# Patient Record
Sex: MALE | Race: WHITE | NOT HISPANIC OR LATINO | Employment: FULL TIME | URBAN - METROPOLITAN AREA
[De-identification: names, ages, dates, MRNs, and addresses within clinical notes are randomized per-mention and may not be internally consistent; named-entity substitution may affect disease eponyms.]

---

## 2023-04-19 ENCOUNTER — HOSPITAL ENCOUNTER (OUTPATIENT)
Dept: DATA CONVERSION | Facility: HOSPITAL | Age: 63
End: 2023-04-19
Attending: SURGERY | Admitting: SURGERY
Payer: COMMERCIAL

## 2023-04-19 DIAGNOSIS — Z53.8 PROCEDURE AND TREATMENT NOT CARRIED OUT FOR OTHER REASONS: ICD-10-CM

## 2023-04-19 DIAGNOSIS — K64.8 OTHER HEMORRHOIDS: ICD-10-CM

## 2023-08-23 ENCOUNTER — HOSPITAL ENCOUNTER (OUTPATIENT)
Dept: DATA CONVERSION | Facility: HOSPITAL | Age: 63
End: 2023-08-23
Attending: SURGERY | Admitting: SURGERY
Payer: COMMERCIAL

## 2023-08-23 DIAGNOSIS — K64.4 RESIDUAL HEMORRHOIDAL SKIN TAGS: ICD-10-CM

## 2023-08-23 DIAGNOSIS — K64.8 OTHER HEMORRHOIDS: ICD-10-CM

## 2023-08-23 DIAGNOSIS — K60.2 ANAL FISSURE, UNSPECIFIED: ICD-10-CM

## 2023-09-01 LAB
COMPLETE PATHOLOGY REPORT: NORMAL
CONVERTED CLINICAL DIAGNOSIS-HISTORY: NORMAL
CONVERTED FINAL DIAGNOSIS: NORMAL
CONVERTED FINAL REPORT PDF LINK TO COPY AND PASTE: NORMAL
CONVERTED GROSS DESCRIPTION: NORMAL

## 2023-09-07 VITALS — HEIGHT: 67 IN | WEIGHT: 209.44 LBS | BODY MASS INDEX: 32.87 KG/M2

## 2023-09-14 NOTE — H&P
History of Present Illness:   History Present Illness:  Reason for surgery: Hemorrhoids   HPI:    Patient has symptomatic hemorrhoids. Requires excision.    Allergies:        Allergies:  ·  No Known Allergies :     Home Medication Review:   Home Medications Reviewed: yes     Impression/Procedure:   ·  Impression and Planned Procedure: Hemorrhoids and hemorrhoidectomy       ERAS (Enhanced Recovery After Surgery):  ·  ERAS Patient: no       Physical Exam by System:    Respiratory/Thorax: CTAB   Cardiovascular: RRR     Consent:   COVID-19 Consent:  ·  COVID-19 Risk Consent Surgeon has reviewed key risks related to the risk of rodrigo COVID-19 and if they contract COVID-19 what the risks are.     Attestation:   Note Completion:  I am a:  Resident/Fellow   Attending Attestation I saw and evaluated the patient.  I personally obtained the key and critical portions of the history and physical exam or was physically present for key and  critical portions performed by the resident/fellow. I reviewed the resident/fellow?s documentation and discussed the patient with the resident/fellow.  I agree with the resident/fellow?s medical decision making as documented in their note  with the exception/addition of the following:    I personally evaluated the patient on 19-Apr-2023   Comments/ Additional Findings    Pt blood pressure very elevated, not improving.  D/W Dr Johnson.  Recc pt to be seen by cardiology and blood pressure be under better control prior  to proceeding with elective hemorrhoid surgery.  D/W pt and wife agreeable to plan.  Will need to reschedule surgery once BP controlled.           Electronic Signatures:  Fausto Lazar (Resident))  (Signed 19-Apr-2023 08:00)   Authored: History of Present Illness, Allergies, Home  Medication Review, Impression/Procedure, ERAS, Physical Exam, Consent, Note Completion  Sherrie Barriga)  (Signed 19-Apr-2023 09:14)   Authored: Note Completion   Co-Signer: History of  Present Illness, Allergies, Home Medication Review, Impression/Procedure, ERAS, Physical Exam, Consent, Note Completion      Last Updated: 19-Apr-2023 09:14 by Sherrie Barriga)

## 2023-09-29 VITALS
DIASTOLIC BLOOD PRESSURE: 109 MMHG | BODY MASS INDEX: 30.74 KG/M2 | HEART RATE: 71 BPM | WEIGHT: 202.82 LBS | RESPIRATION RATE: 16 BRPM | SYSTOLIC BLOOD PRESSURE: 139 MMHG | HEIGHT: 68 IN | TEMPERATURE: 97.9 F

## 2023-09-30 NOTE — H&P
History of Present Illness:   History Present Illness:  Reason for surgery: hemorrhoids   HPI:    pt here for hemorrhoidectomy X 3     Allergies:        Allergies:  ·  No Known Allergies :     Home Medication Review:   Home Medications Reviewed: yes     Impression/Procedure:   ·  Impression and Planned Procedure: Pt here for hemorrhoidectomy X3       ERAS (Enhanced Recovery After Surgery):  ·  ERAS Patient: no       Vital Signs:  Temperature C: 36.6 degrees C   Temperature F: 97.8 degrees F   Heart Rate: 71 beats per minute   Respiratory Rate: 16 breath per minute   Blood Pressure Systolic: 139 mm/Hg   Blood Pressure Diastolic: 109 mm/Hg     Physical Exam by System:    Constitutional: NAD   Respiratory/Thorax: non labored   Cardiovascular: RRR     Consent:   COVID-19 Consent:  ·  COVID-19 Risk Consent Surgeon has reviewed key risks related to the risk of rodrigo COVID-19 and if they contract COVID-19 what the risks are.       Electronic Signatures:  Sherrie Barriga)  (Signed 23-Aug-2023 07:45)   Authored: History of Present Illness, Allergies, Home  Medication Review, Impression/Procedure, ERAS, Physical Exam, Consent, Note Completion      Last Updated: 23-Aug-2023 07:45 by Sherrie Barriga)

## 2023-10-01 NOTE — OP NOTE
PROCEDURE DETAILS    Preoperative Diagnosis:  internal hemorrhoids, K64.8    Postoperative Diagnosis:  posterior fissure with external hemorrhoid anteriorly  Surgeon: Sherrie Barriga  Resident/Fellow/Other Assistant: Giovanna Evans    Procedure:  1.  HEMORRHOIDECTOMY X 1, and internal lateral sphincterotomy    Anesthesia: No anesthesiologist associated with this case  Estimated Blood Loss: 2  Findings: Pt with posterior fissure with tight internal  sphincter and external hemorrhoids anteriorly.   Specimens(s) Collected: yes,  hemorrhoid         Operative Report:   Indication for procedure:   Pt had presented with hemorrhoids to office previously improved but still with issues here for exam under anesthesia and hemorrhoidectomy    Procedure:  Pt taken to or placed supine general anesthesia administered.  LMA placed.  Pt  into lithotomy position.  Rectal exam was done pt was  found to have a very tight internal sphincter and a posterior fissure.  Pt just with external hemorrhoids anteriorly.  The sphincter was injected laterally in a fanning fashion. 10 ml on both sides.  The left lateral skin overlying the sphincter was opened  and grasped with an Allis Clamp and partially burned.  The larger half moon was now able to be inserted.  The external hemorrhoid was grasped with a Wayne and removed with a harmonic scalpel.  Lidocaine jelly inserted and dressing applied.  Pt awakened  and LMA removed taken to PACU in stable condition.   Note Recipients:   Lizeth Curry MD - [phone] [preferred]  Sherrie Barriga MD - [phone] [Preferred]                        Attestation:   Note Completion:  Attending Attestation I performed the procedure without a resident         Electronic Signatures:  Sherrie Barriga)  (Signed 23-Aug-2023 08:32)   Authored: Post-Operative Note, Chart Review, Note Completion      Last Updated: 23-Aug-2023 08:32 by Sherrie Barriga)

## 2023-10-12 ENCOUNTER — OFFICE VISIT (OUTPATIENT)
Dept: ORTHOPEDIC SURGERY | Facility: HOSPITAL | Age: 63
End: 2023-10-12
Payer: MEDICARE

## 2023-10-12 DIAGNOSIS — M19.041 PRIMARY OSTEOARTHRITIS OF RIGHT HAND: Primary | ICD-10-CM

## 2023-10-12 DIAGNOSIS — M19.042 PRIMARY OSTEOARTHRITIS OF LEFT HAND: ICD-10-CM

## 2023-10-12 PROCEDURE — 1160F RVW MEDS BY RX/DR IN RCRD: CPT | Performed by: ORTHOPAEDIC SURGERY

## 2023-10-12 PROCEDURE — 2500000005 HC RX 250 GENERAL PHARMACY W/O HCPCS: Performed by: ORTHOPAEDIC SURGERY

## 2023-10-12 PROCEDURE — 99213 OFFICE O/P EST LOW 20 MIN: CPT | Performed by: ORTHOPAEDIC SURGERY

## 2023-10-12 PROCEDURE — 1159F MED LIST DOCD IN RCRD: CPT | Performed by: ORTHOPAEDIC SURGERY

## 2023-10-12 PROCEDURE — 20604 DRAIN/INJ JOINT/BURSA W/US: CPT | Performed by: ORTHOPAEDIC SURGERY

## 2023-10-12 PROCEDURE — 20604 DRAIN/INJ JOINT/BURSA W/US: CPT | Mod: 50 | Performed by: ORTHOPAEDIC SURGERY

## 2023-10-12 PROCEDURE — 1036F TOBACCO NON-USER: CPT | Performed by: ORTHOPAEDIC SURGERY

## 2023-10-12 PROCEDURE — 3008F BODY MASS INDEX DOCD: CPT | Performed by: ORTHOPAEDIC SURGERY

## 2023-10-12 PROCEDURE — 1125F AMNT PAIN NOTED PAIN PRSNT: CPT | Performed by: ORTHOPAEDIC SURGERY

## 2023-10-12 RX ORDER — LISINOPRIL 10 MG/1
10 TABLET ORAL DAILY
COMMUNITY

## 2023-10-12 RX ORDER — OMEPRAZOLE 10 MG/1
10 CAPSULE, DELAYED RELEASE ORAL
COMMUNITY

## 2023-10-12 RX ORDER — METHYLPREDNISOLONE ACETATE 40 MG/ML
40 INJECTION, SUSPENSION INTRA-ARTICULAR; INTRALESIONAL; INTRAMUSCULAR; SOFT TISSUE ONCE
Status: SHIPPED | OUTPATIENT
Start: 2023-10-12

## 2023-10-12 RX ORDER — LIDOCAINE HYDROCHLORIDE 10 MG/ML
0.5 INJECTION INFILTRATION; PERINEURAL
Status: COMPLETED | OUTPATIENT
Start: 2023-10-12 | End: 2023-10-12

## 2023-10-12 RX ADMIN — LIDOCAINE HYDROCHLORIDE 0.5 ML: 10 INJECTION, SOLUTION INFILTRATION; PERINEURAL at 13:32

## 2023-10-12 ASSESSMENT — PATIENT HEALTH QUESTIONNAIRE - PHQ9
2. FEELING DOWN, DEPRESSED OR HOPELESS: NOT AT ALL
SUM OF ALL RESPONSES TO PHQ9 QUESTIONS 1 AND 2: 0
1. LITTLE INTEREST OR PLEASURE IN DOING THINGS: NOT AT ALL

## 2023-10-12 ASSESSMENT — ENCOUNTER SYMPTOMS
WHEEZING: 0
ARTHRALGIAS: 1
TROUBLE SWALLOWING: 0
COLOR CHANGE: 0
JOINT SWELLING: 1
SHORTNESS OF BREATH: 0
SINUS PAIN: 0
FEVER: 0
CHILLS: 0
FATIGUE: 0

## 2023-10-12 ASSESSMENT — PAIN SCALES - GENERAL: PAINLEVEL_OUTOF10: 4

## 2023-10-12 ASSESSMENT — PAIN - FUNCTIONAL ASSESSMENT: PAIN_FUNCTIONAL_ASSESSMENT: 0-10

## 2023-10-12 NOTE — PROGRESS NOTES
Reason for Appointment  B/l hand pain and swelling     History of Present Illness  Patient is a 63 y.o. male here today for follow-up evaluation of recurrent bilateral hand pain and swelling. He has had multiple. Injections in the past that do give him good relief.  He has pain and swelling over index MP joints, he is very active and does a lot of work with tools.  He has pain with repetitive pinching and gripping.  He is not interested in any surgery at this point.  He would like repeat injections today.  No other changes in his past medical history, allergies, or medications.    History reviewed. No pertinent past medical history.    Past Surgical History:   Procedure Laterality Date    CARDIAC SURGERY  07/19/2018    Heart Surgery    FOOT SURGERY  07/19/2018    Foot Surgery    HAND SURGERY  07/19/2018    Hand Surgery                                                                                                                                                          HERNIA REPAIR  07/19/2018    Hernia Repair    KNEE SURGERY  07/19/2018    Knee Surgery Left    NECK SURGERY  07/19/2018    Neck Surgery    OTHER SURGICAL HISTORY  07/19/2018    Amputation Of Middle Finger, With Neurectomy (Each)    OTHER SURGICAL HISTORY  07/19/2018    Shoulder Surgery Left    SHOULDER SURGERY  07/19/2018    Shoulder Surgery Right       Medication Documentation Review Audit       Reviewed by Taylor Garcia MA (Medical Assistant) on 10/12/23 at 1147      Medication Order Taking? Sig Documenting Provider Last Dose Status   docusate sodium (Colace) 100 mg capsule [id number] No TAKE 1 CAPSULE BY MOUTH ONCE DAILY   Patient not taking: Reported on 10/12/2023    Sherrie Barriga MD Not Taking Active   lidocaine HCL 3 % cream [id number] No APPLY TOPICALLY TO AFFECTED AREA 3 TIMES A DAY   Patient not taking: Reported on 10/12/2023    Sherrie Barriga MD Not Taking Active   lisinopril 10 mg tablet [id number]  Take 1 tablet (10 mg) by mouth  once daily. Historical Provider, MD  Active   omeprazole (PriLOSEC) 10 mg DR capsule [id number]  Take 1 capsule (10 mg) by mouth once daily. Historical Provider, MD  Active   oxyCODONE-acetaminophen (Percocet) 5-325 mg tablet [id number] No TAKE 1 TABLET BY MOUTH EVERY 6 HOURS   Patient not taking: Reported on 10/12/2023    Sherrie Barriga MD Not Taking Active                    No Known Allergies    Review of Systems   Constitutional:  Negative for chills, fatigue and fever.   HENT:  Negative for sinus pain and trouble swallowing.    Respiratory:  Negative for shortness of breath and wheezing.    Cardiovascular:  Negative for chest pain and leg swelling.   Musculoskeletal:  Positive for arthralgias and joint swelling.   Skin:  Negative for color change and rash.     Exam   On exam bilateral hands show DJD, swelling over bilateral index MP joints right greater than left.  Significant tenderness over bilateral MP joints, no triggering of the digits.  Pain with flexion.  Good pulses and sensation in the upper extremity.    Assessment   Bilateral hand osteoarthritis    Plan   We sterilely injected under US guidance Depo-medrol and liodcaine into bilateral index MP joints. Patient understands the small risk of infection and the signs to look for as well as flare reaction and the risks of repeated injections. He can follow up with us as needed    Hand / UE Inj/Asp: L index MCP for osteoarthritis on 10/12/2023 1:32 PM  Indications: pain  Details: 25 G needle, ultrasound-guided  Medications: 0.5 mL lidocaine 10 mg/mL (1 %)  Outcome: tolerated well, no immediate complications    After discussing the risks and benefits of the procedure, we proceeded with the injections.  Under ultrasound guidance the bilateral index finger metacarpal head and the base of the proximal phalanx was identified.  We then placed the needle under ultrasound guidance into the joint space and sterilely injected 20 mg of Depo-Medrol and a small amount  of lidocaine.  Patient tolerated the procedures well without any adverse side effects.   Procedure, treatment alternatives, risks and benefits explained, specific risks discussed. Consent was given by the patient. Immediately prior to procedure a time out was called to verify the correct patient, procedure, equipment, support staff and site/side marked as required. Patient was prepped and draped in the usual sterile fashion.             Written by Di Patel saw, evaluated, and treated the patient with the PA

## 2024-02-29 ENCOUNTER — OFFICE VISIT (OUTPATIENT)
Dept: ORTHOPEDIC SURGERY | Facility: HOSPITAL | Age: 64
End: 2024-02-29
Payer: COMMERCIAL

## 2024-02-29 DIAGNOSIS — M19.041 PRIMARY OSTEOARTHRITIS OF BOTH HANDS: Primary | ICD-10-CM

## 2024-02-29 DIAGNOSIS — M19.042 PRIMARY OSTEOARTHRITIS OF BOTH HANDS: Primary | ICD-10-CM

## 2024-02-29 PROCEDURE — 1160F RVW MEDS BY RX/DR IN RCRD: CPT | Performed by: ORTHOPAEDIC SURGERY

## 2024-02-29 PROCEDURE — 20604 DRAIN/INJ JOINT/BURSA W/US: CPT | Mod: 50 | Performed by: ORTHOPAEDIC SURGERY

## 2024-02-29 PROCEDURE — 20604 DRAIN/INJ JOINT/BURSA W/US: CPT | Performed by: ORTHOPAEDIC SURGERY

## 2024-02-29 PROCEDURE — 2500000004 HC RX 250 GENERAL PHARMACY W/ HCPCS (ALT 636 FOR OP/ED): Performed by: ORTHOPAEDIC SURGERY

## 2024-02-29 PROCEDURE — 2500000005 HC RX 250 GENERAL PHARMACY W/O HCPCS: Performed by: ORTHOPAEDIC SURGERY

## 2024-02-29 PROCEDURE — 1159F MED LIST DOCD IN RCRD: CPT | Performed by: ORTHOPAEDIC SURGERY

## 2024-02-29 PROCEDURE — 1125F AMNT PAIN NOTED PAIN PRSNT: CPT | Performed by: ORTHOPAEDIC SURGERY

## 2024-02-29 PROCEDURE — 99213 OFFICE O/P EST LOW 20 MIN: CPT | Performed by: ORTHOPAEDIC SURGERY

## 2024-02-29 PROCEDURE — 3008F BODY MASS INDEX DOCD: CPT | Performed by: ORTHOPAEDIC SURGERY

## 2024-02-29 PROCEDURE — 1036F TOBACCO NON-USER: CPT | Performed by: ORTHOPAEDIC SURGERY

## 2024-02-29 RX ORDER — METHYLPREDNISOLONE ACETATE 40 MG/ML
20 INJECTION, SUSPENSION INTRA-ARTICULAR; INTRALESIONAL; INTRAMUSCULAR; SOFT TISSUE
Status: COMPLETED | OUTPATIENT
Start: 2024-02-29 | End: 2024-02-29

## 2024-02-29 RX ORDER — LIDOCAINE HYDROCHLORIDE 10 MG/ML
0.5 INJECTION INFILTRATION; PERINEURAL
Status: COMPLETED | OUTPATIENT
Start: 2024-02-29 | End: 2024-02-29

## 2024-02-29 RX ADMIN — LIDOCAINE HYDROCHLORIDE 0.5 ML: 10 INJECTION, SOLUTION INFILTRATION; PERINEURAL at 10:23

## 2024-02-29 RX ADMIN — METHYLPREDNISOLONE ACETATE 20 MG: 40 INJECTION, SUSPENSION INTRA-ARTICULAR; INTRALESIONAL; INTRAMUSCULAR; INTRASYNOVIAL; SOFT TISSUE at 10:24

## 2024-02-29 RX ADMIN — METHYLPREDNISOLONE ACETATE 20 MG: 40 INJECTION, SUSPENSION INTRA-ARTICULAR; INTRALESIONAL; INTRAMUSCULAR; INTRASYNOVIAL; SOFT TISSUE at 10:23

## 2024-02-29 RX ADMIN — LIDOCAINE HYDROCHLORIDE 0.5 ML: 10 INJECTION, SOLUTION INFILTRATION; PERINEURAL at 10:24

## 2024-02-29 ASSESSMENT — ENCOUNTER SYMPTOMS
FATIGUE: 0
TROUBLE SWALLOWING: 0
JOINT SWELLING: 1
FEVER: 0
CHILLS: 0
SHORTNESS OF BREATH: 0
WHEEZING: 0

## 2024-02-29 ASSESSMENT — PAIN SCALES - GENERAL: PAINLEVEL_OUTOF10: 7

## 2024-02-29 ASSESSMENT — PAIN - FUNCTIONAL ASSESSMENT: PAIN_FUNCTIONAL_ASSESSMENT: 0-10

## 2024-02-29 NOTE — PROGRESS NOTES
Reason for Appointment  B/l index finger pain and swelling     History of Present Illness  Patient is a 63 y.o. male here today for follow-up evaluation of recurrent bilateral index finger pain and swelling.  He has significant arthritis in the index finger MP joints, he has pain with repetitive gripping and in the cold.  He does wear gloves that help the pain.  He was an  for over 40 years and did a lot of repetitive activity with his hands.  Give him 4 to 6 months of relief.  No other changes in his past medical history, allergies, or medications.    History reviewed. No pertinent past medical history.    Past Surgical History:   Procedure Laterality Date    CARDIAC SURGERY  07/19/2018    Heart Surgery    FOOT SURGERY  07/19/2018    Foot Surgery    HAND SURGERY  07/19/2018    Hand Surgery                                                                                                                                                          HERNIA REPAIR  07/19/2018    Hernia Repair    KNEE SURGERY  07/19/2018    Knee Surgery Left    NECK SURGERY  07/19/2018    Neck Surgery    OTHER SURGICAL HISTORY  07/19/2018    Amputation Of Middle Finger, With Neurectomy (Each)    OTHER SURGICAL HISTORY  07/19/2018    Shoulder Surgery Left    SHOULDER SURGERY  07/19/2018    Shoulder Surgery Right       Medication Documentation Review Audit       Reviewed by Taylor Garcia MA (Medical Assistant) on 02/29/24 at 0920      Medication Order Taking? Sig Documenting Provider Last Dose Status   docusate sodium (Colace) 100 mg capsule [id number] Yes TAKE 1 CAPSULE BY MOUTH ONCE DAILY Sherrie Barriga MD Taking Active   lidocaine HCL 3 % cream [id number] Yes APPLY TOPICALLY TO AFFECTED AREA 3 TIMES A DAY Sherrie Barriga MD Taking Active   lisinopril 10 mg tablet [id number] Yes Take 1 tablet (10 mg) by mouth once daily. Historical Provider, MD Taking Active   methylPREDNISolone acetate (DEPO-Medrol) injection 40 mg [id number]    Di Perez PA-C  Active   methylPREDNISolone acetate (DEPO-Medrol) injection 40 mg [id number]   Di Perez PA-C  Active   omeprazole (PriLOSEC) 10 mg DR capsule [id number] Yes Take 1 capsule (10 mg) by mouth once daily. Historical Provider, MD Taking Active   oxyCODONE-acetaminophen (Percocet) 5-325 mg tablet [id number]  TAKE 1 TABLET BY MOUTH EVERY 6 HOURS   Patient not taking: Reported on 10/12/2023    Sherrie Barriga MD   [phone]                    No Known Allergies    Review of Systems   Constitutional:  Negative for chills, fatigue and fever.   HENT:  Negative for mouth sores and trouble swallowing.    Respiratory:  Negative for shortness of breath and wheezing.    Cardiovascular:  Negative for chest pain and leg swelling.   Musculoskeletal:  Positive for joint swelling.   Skin:  Negative for pallor and rash.     Exam   On exam bilateral hands show DJD but he has more swelling over bilateral MP joints.  Tenderness over bilateral index finger MP joints, pain with heavy gripping but no triggering of the digits.  Good pulses and sensation in the upper extremities.    Assessment   Bilateral hand osteoarthritis    Plan   We sterilely injected under ultrasound guidance Depo-Medrol and lidocaine into bilateral index finger MP joints.  Patient understands the small risk of infection and the signs to look for as well as flare reaction.  Hopefully these give him good relief.  He can follow-up with us as needed for occasional injections.    S Inj/Asp: R index MCP on 2024 10:23 AM  Indications: pain  Details: 25 G needle, ultrasound-guided  Medications: 0.5 mL lidocaine 10 mg/mL (1 %); 20 mg methylPREDNISolone acetate 40 mg/mL  Outcome: tolerated well, no immediate complications    After discussing the risks and benefits of the procedure, we proceeded with the injections.  Under ultrasound guidance the bilateral index finger metacarpal heads and the base of the proximal phalanx were  identified.  We then placed the needle under ultrasound guidance into the joint spaces and sterilely injected 20 mg of Depo-Medrol and a small amount of lidocaine.  Patient tolerated the procedures well without any adverse side effects.   Procedure, treatment alternatives, risks and benefits explained, specific risks discussed. Immediately prior to procedure a time out was called to verify the correct patient, procedure, equipment, support staff and site/side marked as required. Patient was prepped and draped in the usual sterile fashion.       S Inj/Asp: L index MCP on 2/29/2024 10:24 AM  Indications: pain  Details: 25 G needle, ultrasound-guided  Medications: 0.5 mL lidocaine 10 mg/mL (1 %); 20 mg methylPREDNISolone acetate 40 mg/mL  Outcome: tolerated well, no immediate complications  Procedure, treatment alternatives, risks and benefits explained, specific risks discussed. Immediately prior to procedure a time out was called to verify the correct patient, procedure, equipment, support staff and site/side marked as required. Patient was prepped and draped in the usual sterile fashion.       Written by Di Patel saw, evaluated, and treated the patient with the PA

## 2024-10-29 ENCOUNTER — OFFICE VISIT (OUTPATIENT)
Dept: ORTHOPEDIC SURGERY | Facility: CLINIC | Age: 64
End: 2024-10-29
Payer: MEDICARE

## 2024-10-29 DIAGNOSIS — M19.042 PRIMARY OSTEOARTHRITIS OF BOTH HANDS: Primary | ICD-10-CM

## 2024-10-29 DIAGNOSIS — M19.041 PRIMARY OSTEOARTHRITIS OF BOTH HANDS: Primary | ICD-10-CM

## 2024-10-29 PROCEDURE — 1160F RVW MEDS BY RX/DR IN RCRD: CPT | Performed by: ORTHOPAEDIC SURGERY

## 2024-10-29 PROCEDURE — 99213 OFFICE O/P EST LOW 20 MIN: CPT | Performed by: ORTHOPAEDIC SURGERY

## 2024-10-29 PROCEDURE — 1159F MED LIST DOCD IN RCRD: CPT | Performed by: ORTHOPAEDIC SURGERY

## 2024-10-29 PROCEDURE — 1125F AMNT PAIN NOTED PAIN PRSNT: CPT | Performed by: ORTHOPAEDIC SURGERY

## 2024-10-29 PROCEDURE — 20604 DRAIN/INJ JOINT/BURSA W/US: CPT | Mod: LT | Performed by: ORTHOPAEDIC SURGERY

## 2024-10-29 PROCEDURE — 2500000004 HC RX 250 GENERAL PHARMACY W/ HCPCS (ALT 636 FOR OP/ED): Performed by: ORTHOPAEDIC SURGERY

## 2024-10-29 PROCEDURE — 20604 DRAIN/INJ JOINT/BURSA W/US: CPT | Mod: RT | Performed by: ORTHOPAEDIC SURGERY

## 2024-10-29 PROCEDURE — 1036F TOBACCO NON-USER: CPT | Performed by: ORTHOPAEDIC SURGERY

## 2024-10-29 RX ORDER — LIDOCAINE HYDROCHLORIDE 10 MG/ML
1 INJECTION, SOLUTION INFILTRATION; PERINEURAL
Status: COMPLETED | OUTPATIENT
Start: 2024-10-29 | End: 2024-10-29

## 2024-10-29 RX ORDER — METHYLPREDNISOLONE ACETATE 40 MG/ML
20 INJECTION, SUSPENSION INTRA-ARTICULAR; INTRALESIONAL; INTRAMUSCULAR; SOFT TISSUE
Status: COMPLETED | OUTPATIENT
Start: 2024-10-29 | End: 2024-10-29

## 2024-10-29 ASSESSMENT — ENCOUNTER SYMPTOMS
SINUS PAIN: 0
WHEEZING: 0
SHORTNESS OF BREATH: 0
ARTHRALGIAS: 1
FEVER: 0
JOINT SWELLING: 1
CHILLS: 0
FATIGUE: 0
WOUND: 0

## 2024-10-29 ASSESSMENT — PAIN SCALES - GENERAL: PAINLEVEL_OUTOF10: 9

## 2024-10-29 ASSESSMENT — PAIN - FUNCTIONAL ASSESSMENT: PAIN_FUNCTIONAL_ASSESSMENT: 0-10

## 2025-04-15 ENCOUNTER — OFFICE VISIT (OUTPATIENT)
Dept: ORTHOPEDIC SURGERY | Facility: CLINIC | Age: 65
End: 2025-04-15
Payer: MEDICARE

## 2025-04-15 VITALS — WEIGHT: 195 LBS | BODY MASS INDEX: 29.55 KG/M2 | HEIGHT: 68 IN

## 2025-04-15 DIAGNOSIS — M19.041 PRIMARY OSTEOARTHRITIS OF BOTH HANDS: Primary | ICD-10-CM

## 2025-04-15 DIAGNOSIS — M19.042 PRIMARY OSTEOARTHRITIS OF BOTH HANDS: Primary | ICD-10-CM

## 2025-04-15 PROCEDURE — 20604 DRAIN/INJ JOINT/BURSA W/US: CPT | Mod: RT | Performed by: ORTHOPAEDIC SURGERY

## 2025-04-15 PROCEDURE — 3008F BODY MASS INDEX DOCD: CPT | Performed by: ORTHOPAEDIC SURGERY

## 2025-04-15 PROCEDURE — 1036F TOBACCO NON-USER: CPT | Performed by: ORTHOPAEDIC SURGERY

## 2025-04-15 PROCEDURE — 1160F RVW MEDS BY RX/DR IN RCRD: CPT | Performed by: ORTHOPAEDIC SURGERY

## 2025-04-15 PROCEDURE — 1159F MED LIST DOCD IN RCRD: CPT | Performed by: ORTHOPAEDIC SURGERY

## 2025-04-15 PROCEDURE — 99213 OFFICE O/P EST LOW 20 MIN: CPT | Mod: 25 | Performed by: ORTHOPAEDIC SURGERY

## 2025-04-15 PROCEDURE — 1125F AMNT PAIN NOTED PAIN PRSNT: CPT | Performed by: ORTHOPAEDIC SURGERY

## 2025-04-15 PROCEDURE — 20604 DRAIN/INJ JOINT/BURSA W/US: CPT | Mod: LT | Performed by: ORTHOPAEDIC SURGERY

## 2025-04-15 PROCEDURE — 99213 OFFICE O/P EST LOW 20 MIN: CPT | Performed by: ORTHOPAEDIC SURGERY

## 2025-04-15 PROCEDURE — 2500000004 HC RX 250 GENERAL PHARMACY W/ HCPCS (ALT 636 FOR OP/ED): Performed by: ORTHOPAEDIC SURGERY

## 2025-04-15 RX ORDER — LIDOCAINE HYDROCHLORIDE 10 MG/ML
1 INJECTION, SOLUTION INFILTRATION; PERINEURAL
Status: COMPLETED | OUTPATIENT
Start: 2025-04-15 | End: 2025-04-15

## 2025-04-15 RX ORDER — METHYLPREDNISOLONE ACETATE 40 MG/ML
20 INJECTION, SUSPENSION INTRA-ARTICULAR; INTRALESIONAL; INTRAMUSCULAR; SOFT TISSUE
Status: COMPLETED | OUTPATIENT
Start: 2025-04-15 | End: 2025-04-15

## 2025-04-15 RX ADMIN — METHYLPREDNISOLONE ACETATE 20 MG: 40 INJECTION, SUSPENSION INTRA-ARTICULAR; INTRALESIONAL; INTRAMUSCULAR; SOFT TISSUE at 09:53

## 2025-04-15 RX ADMIN — METHYLPREDNISOLONE ACETATE 20 MG: 40 INJECTION, SUSPENSION INTRA-ARTICULAR; INTRALESIONAL; INTRAMUSCULAR; SOFT TISSUE at 09:52

## 2025-04-15 RX ADMIN — LIDOCAINE HYDROCHLORIDE 1 ML: 10 INJECTION, SOLUTION INFILTRATION; PERINEURAL at 09:53

## 2025-04-15 RX ADMIN — LIDOCAINE HYDROCHLORIDE 1 ML: 10 INJECTION, SOLUTION INFILTRATION; PERINEURAL at 09:52

## 2025-04-15 ASSESSMENT — ENCOUNTER SYMPTOMS
CHILLS: 0
BRUISES/BLEEDS EASILY: 0
FATIGUE: 0
FEVER: 0
ARTHRALGIAS: 1
WHEEZING: 0
SHORTNESS OF BREATH: 0

## 2025-04-15 ASSESSMENT — PAIN SCALES - GENERAL: PAINLEVEL_OUTOF10: 10-WORST PAIN EVER

## 2025-04-15 ASSESSMENT — COLUMBIA-SUICIDE SEVERITY RATING SCALE - C-SSRS
1. IN THE PAST MONTH, HAVE YOU WISHED YOU WERE DEAD OR WISHED YOU COULD GO TO SLEEP AND NOT WAKE UP?: NO
6. HAVE YOU EVER DONE ANYTHING, STARTED TO DO ANYTHING, OR PREPARED TO DO ANYTHING TO END YOUR LIFE?: NO
2. HAVE YOU ACTUALLY HAD ANY THOUGHTS OF KILLING YOURSELF?: NO

## 2025-04-15 ASSESSMENT — PATIENT HEALTH QUESTIONNAIRE - PHQ9
1. LITTLE INTEREST OR PLEASURE IN DOING THINGS: NOT AT ALL
2. FEELING DOWN, DEPRESSED OR HOPELESS: NOT AT ALL
SUM OF ALL RESPONSES TO PHQ9 QUESTIONS 1 AND 2: 0

## 2025-04-15 NOTE — PROGRESS NOTES
Reason for Appointment  Chief Complaint   Patient presents with    Left Hand - Pain    Right Hand - Pain     History of Present Illness  Patient is a 65 y.o. male here today for follow-up evaluation of bilateral hand pain. We last saw the patient on 10/29/24 for recurrent bilateral index finger MP joint pain and swelling and we gave bilateral index mp joint injections. Today he states his ain returned two months ago. Right worse than left. No triggering. Pain in knuckles hard to . Pain with gripping and opening jars/tubes. Dupuytren's nodule forming left ring finger, no sig contracture yet. No recent falls or injuries. No other changes in past medical history, allergies, or medications.      History reviewed. No pertinent past medical history.    Past Surgical History:   Procedure Laterality Date    CARDIAC SURGERY  07/19/2018    Heart Surgery    FOOT SURGERY  07/19/2018    Foot Surgery    HAND SURGERY  07/19/2018    Hand Surgery                                                                                                                                                          HERNIA REPAIR  07/19/2018    Hernia Repair    KNEE SURGERY  07/19/2018    Knee Surgery Left    NECK SURGERY  07/19/2018    Neck Surgery    OTHER SURGICAL HISTORY  07/19/2018    Amputation Of Middle Finger, With Neurectomy (Each)    OTHER SURGICAL HISTORY  07/19/2018    Shoulder Surgery Left    SHOULDER SURGERY  07/19/2018    Shoulder Surgery Right       Medication Documentation Review Audit       Reviewed by Traci Perales MA (Medical Assistant) on 04/15/25 at 0942      Medication Order Taking? Sig Documenting Provider Last Dose Status   lisinopril 10 mg tablet [id number] Yes Take 1 tablet (10 mg) by mouth once daily. Historical Provider, MD Taking Active   methylPREDNISolone acetate (DEPO-Medrol) injection 40 mg [id number]   Di Perez PA-C  Active   methylPREDNISolone acetate (DEPO-Medrol) injection 40 mg [id number]   Di CRAVEN  HANS Perez  Active   omeprazole (PriLOSEC) 10 mg DR capsule [id number] Yes Take 1 capsule (10 mg) by mouth once daily. Historical Provider, MD Taking Active                    No Known Allergies    Review of Systems   Constitutional:  Negative for chills, fatigue and fever.   Respiratory:  Negative for shortness of breath and wheezing.    Cardiovascular:  Negative for chest pain and leg swelling.   Musculoskeletal:  Positive for arthralgias.   Allergic/Immunologic: Negative for immunocompromised state.   Hematological:  Does not bruise/bleed easily.       Exam   Dupuytren's nodule forming left ring finger, no sig contracture yet. No triggering. Tender over the bilateral index mp joints, right worse than left. Good pulses and sensation.   Assessment   Bilateral hand osteoarthritis    Plan   We sterilely injected under ultrasound guidance Depo-Medrol lidocaine into bilateral index finger MP joints.  Patient understands the small risk of infection and the signs to look for as well as flare action.  Hopefully these give him good relief.  He can follow-up with us as needed for occasional injections.   Goal is to avoid surgical intervention in the future because replacement or fusion are difficult procedures with variable results.  Patient ID: Steven Lambert is a 65 y.o. male.    S Inj/Asp: R index MCP on 4/15/2025 9:52 AM  Indications: pain  Details: ultrasound-guided  Medications: 1 mL lidocaine 10 mg/mL (1 %); 20 mg methylPREDNISolone acetate 40 mg/mL  Outcome: tolerated well, no immediate complications    After discussing the risks and benefits of the procedure, we proceeded with the injection.  Under ultrasound guidance the right index finger MP metacarpal head and the base of the proximal phalanx was identified.  We then placed the needle under ultrasound guidance into the joint space and sterilely injected 20 mg of Depo-Medrol and a small amount of lidocaine.  All images obtained and saved. Patient tolerated the  procedure well without any adverse side effects.    Procedure, treatment alternatives, risks and benefits explained, specific risks discussed. Immediately prior to procedure a time out was called to verify the correct patient, procedure, equipment, support staff and site/side marked as required. Patient was prepped and draped in the usual sterile fashion.       S Inj/Asp: L index MCP on 4/15/2025 9:53 AM  Indications: pain  Details: ultrasound-guided  Medications: 1 mL lidocaine 10 mg/mL (1 %); 20 mg methylPREDNISolone acetate 40 mg/mL  Outcome: tolerated well, no immediate complications    After discussing the risks and benefits of the procedure, we proceeded with the injection.  Under ultrasound guidance the left index finger MP metacarpal head and the base of the proximal phalanx was identified.  We then placed the needle under ultrasound guidance into the joint space and sterilely injected 20 mg of Depo-Medrol and a small amount of lidocaine.  All images obtained and saved. Patient tolerated the procedure well without any adverse side effects.    Procedure, treatment alternatives, risks and benefits explained, specific risks discussed. Immediately prior to procedure a time out was called to verify the correct patient, procedure, equipment, support staff and site/side marked as required. Patient was prepped and draped in the usual sterile fashion.       By signing below, I, Trung Patel MD, personally performed the services described in this documentation. All medical record entries made by the scribe were at my direction and in my presence. I have reviewed the chart and agree that the record reflects my personal performance and is accurate and complete.      I, Denise Lr, attest that this documentation has been prepared under the direction and in the presence of Trung Patel MD.

## 2025-09-15 ENCOUNTER — APPOINTMENT (OUTPATIENT)
Dept: UROLOGY | Facility: CLINIC | Age: 65
End: 2025-09-15
Payer: MEDICARE